# Patient Record
Sex: FEMALE | URBAN - NONMETROPOLITAN AREA
[De-identification: names, ages, dates, MRNs, and addresses within clinical notes are randomized per-mention and may not be internally consistent; named-entity substitution may affect disease eponyms.]

---

## 2024-08-06 ENCOUNTER — APPOINTMENT (RX ONLY)
Dept: URBAN - NONMETROPOLITAN AREA CLINIC 22 | Facility: CLINIC | Age: 39
Setting detail: DERMATOLOGY
End: 2024-08-06

## 2024-08-06 VITALS — WEIGHT: 116 LBS | HEIGHT: 67 IN

## 2024-08-06 DIAGNOSIS — Z41.9 ENCOUNTER FOR PROCEDURE FOR PURPOSES OTHER THAN REMEDYING HEALTH STATE, UNSPECIFIED: ICD-10-CM

## 2024-08-06 DIAGNOSIS — Z41.1 ENCOUNTER FOR COSMETIC SURGERY: ICD-10-CM

## 2024-08-06 PROCEDURE — ? CONSULTATION - BREAST (COSMETIC)

## 2024-08-06 PROCEDURE — ? CONSULTATION - ABDOMINOPLASTY

## 2024-08-06 PROCEDURE — ? ORDER FOR SURGERY

## 2024-08-06 PROCEDURE — ? COSMETIC QUOTE

## 2024-08-06 PROCEDURE — ? CONSULTATION - BODY CONTOURING

## 2024-08-06 NOTE — PROCEDURE: CONSULTATION - ABDOMINOPLASTY
Send Procedure Quote As Charge: No
Detail Level: Detailed
Other Plan: Informed patient that she would end up with a low, vertical scar (in addition to the traditional abdominoplasty scar) from her native umbilicus.
Consultation Charge $ (Use Numbers Only, No Text Please.): 50

## 2024-08-06 NOTE — PROCEDURE: ORDER FOR SURGERY
Priority: normal
Surgeon: Kelly
Admission Status: outpatient
Provider: Anton Mendoza MD
Facility: [facility]
Time Frame Unit: day(s)
Assistant: none
Detail Level: Simple

## 2024-08-06 NOTE — PROCEDURE: COSMETIC QUOTE
Body Procedure 1 Free Text Discount (In Dollars- Use Only Numbers And Decimals): 0.00
Face Procedure 8 Percentage Discount: 0
Body Procedure 1 Units: 1
Show Monthly Cost Breakdown: No
Body Procedure 3 Price/Unit (In Dollars- Use Only Numbers And Decimals): 700
Implant 1 Price/Unit (In Dollars- Use Only Numbers And Decimals): 1200
Body Procedure 3: Durasorb
Anesthesia 1 Price/Unit (In Dollars- Use Only Numbers And Decimals): 4042
Apply Facility Fee: Use Facility 1
Quote Title (Optional- Will Act As A Header): Breast Augmentation, Mini Abdominoplasty, and Liposuction of the hips
Detail Level: Zone
Facility 1 Price (In Dollars- Use Only Numbers And Decimals): 0795
Apply Anesthesia Fee: Use Anesthesia 1
Body Procedure 2 Price/Unit (In Dollars- Use Only Numbers And Decimals): 94
Anesthesia 1: 4hr
Body Procedure 2: Garments
Patient Deposit Or Prepayments (Use Numbers Only): 50
Include Sales Tax On Implants: Yes
Body Procedure 1 Price/Unit (In Dollars- Use Only Numbers And Decimals): 8830
Body Procedure 1: Mini abdominoplasty and liposuction of the hips
Body Procedure 1 Price/Unit (In Dollars- Use Only Numbers And Decimals): 1828
Body Procedure 2 Price/Unit (In Dollars- Use Only Numbers And Decimals): 73
Anesthesia 1: 2hr
Facility 1 Price (In Dollars- Use Only Numbers And Decimals): 2050
Anesthesia 1 Price/Unit (In Dollars- Use Only Numbers And Decimals): 104

## 2024-09-12 ENCOUNTER — APPOINTMENT (RX ONLY)
Dept: URBAN - NONMETROPOLITAN AREA CLINIC 22 | Facility: CLINIC | Age: 39
Setting detail: DERMATOLOGY
End: 2024-09-12

## 2024-09-12 DIAGNOSIS — Z41.1 ENCOUNTER FOR COSMETIC SURGERY: ICD-10-CM

## 2024-09-12 PROCEDURE — ? SURGICAL DECISION MAKING

## 2024-09-12 PROCEDURE — ? CONSULTATION - BREAST (COSMETIC)

## 2024-09-12 NOTE — PROCEDURE: CONSULTATION - BREAST (COSMETIC)
Count Minor/Major Decisions Toward Mdm (Not Cosmetic)?: No
Additional Comments: We reviewed implants at previous visit.  Patient does not want a foreign device.  We discussed that her cup size increase would be modest given her limited donor sites.
Detail Level: Detailed

## 2024-09-12 NOTE — PROCEDURE: SURGICAL DECISION MAKING
Surgery/Procedure Discussed: Abdominoplasty
Risk Assessment Explanation (Does Not Render In The Note): Clinical determination of the probability and/or consequences of an event, such as surgery. Clinical assessment of the level of risk is affected by the nature of the event under consideration for the patient. Modifier 57 is used to indicate an Evaluation and Management (E/M) service resulted in the initial decision to perform surgery either the day before a major surgery (90 day global) or the day of a major surgery.
Discussion: Surgery will be performed 9/19, patient would like to her belly button on the lower end
Initial Decision For Surgery?: No

## 2024-10-28 ENCOUNTER — APPOINTMENT (RX ONLY)
Dept: RURAL CLINIC 1 | Facility: CLINIC | Age: 39
Setting detail: DERMATOLOGY
End: 2024-10-28

## 2024-10-28 DIAGNOSIS — Z41.1 ENCOUNTER FOR COSMETIC SURGERY: ICD-10-CM

## 2024-10-28 PROCEDURE — ? DEFER

## 2024-10-28 PROCEDURE — ? COUNSELING - RECONSTRUCTIVE RHINOPLASTY

## 2024-10-28 ASSESSMENT — LOCATION DETAILED DESCRIPTION DERM
LOCATION DETAILED: NASAL DORSUM
LOCATION DETAILED: NASAL SUPRATIP

## 2024-10-28 ASSESSMENT — LOCATION SIMPLE DESCRIPTION DERM: LOCATION SIMPLE: NOSE

## 2024-10-28 ASSESSMENT — LOCATION ZONE DERM: LOCATION ZONE: NOSE

## 2024-10-28 NOTE — PROCEDURE: DEFER
Procedure To Be Performed At Next Visit: -- Select One
Instructions (Optional): Suggested she sees a plastic surgeon that is part of the rhinoplasty society
Detail Level: Detailed